# Patient Record
Sex: FEMALE | Race: AMERICAN INDIAN OR ALASKA NATIVE
[De-identification: names, ages, dates, MRNs, and addresses within clinical notes are randomized per-mention and may not be internally consistent; named-entity substitution may affect disease eponyms.]

---

## 2019-07-17 ENCOUNTER — HOSPITAL ENCOUNTER (INPATIENT)
Dept: HOSPITAL 5 - OR | Age: 69
LOS: 1 days | Discharge: HOME | DRG: 748 | End: 2019-07-18
Attending: OBSTETRICS & GYNECOLOGY | Admitting: OBSTETRICS & GYNECOLOGY
Payer: MEDICARE

## 2019-07-17 DIAGNOSIS — Z88.5: ICD-10-CM

## 2019-07-17 DIAGNOSIS — N81.6: ICD-10-CM

## 2019-07-17 DIAGNOSIS — N81.10: Primary | ICD-10-CM

## 2019-07-17 DIAGNOSIS — Z90.710: ICD-10-CM

## 2019-07-17 PROCEDURE — 85018 HEMOGLOBIN: CPT

## 2019-07-17 PROCEDURE — 85014 HEMATOCRIT: CPT

## 2019-07-17 PROCEDURE — 36415 COLL VENOUS BLD VENIPUNCTURE: CPT

## 2019-07-17 PROCEDURE — 88305 TISSUE EXAM BY PATHOLOGIST: CPT

## 2019-07-17 PROCEDURE — 0JQC0ZZ REPAIR PELVIC REGION SUBCUTANEOUS TISSUE AND FASCIA, OPEN APPROACH: ICD-10-PCS | Performed by: OBSTETRICS & GYNECOLOGY

## 2019-07-17 RX ADMIN — DEXTROSE, SODIUM CHLORIDE, SODIUM LACTATE, POTASSIUM CHLORIDE, AND CALCIUM CHLORIDE SCH MLS/HR: 5; .6; .31; .03; .02 INJECTION, SOLUTION INTRAVENOUS at 21:12

## 2019-07-17 RX ADMIN — KETOROLAC TROMETHAMINE SCH: 30 INJECTION, SOLUTION INTRAMUSCULAR at 18:11

## 2019-07-17 RX ADMIN — KETOROLAC TROMETHAMINE SCH: 30 INJECTION, SOLUTION INTRAMUSCULAR at 12:47

## 2019-07-17 RX ADMIN — KETOROLAC TROMETHAMINE SCH MG: 30 INJECTION, SOLUTION INTRAMUSCULAR at 21:31

## 2019-07-17 RX ADMIN — ONDANSETRON PRN MG: 2 INJECTION INTRAMUSCULAR; INTRAVENOUS at 20:30

## 2019-07-17 RX ADMIN — DEXTROSE, SODIUM CHLORIDE, SODIUM LACTATE, POTASSIUM CHLORIDE, AND CALCIUM CHLORIDE SCH MLS/HR: 5; .6; .31; .03; .02 INJECTION, SOLUTION INTRAVENOUS at 12:50

## 2019-07-17 RX ADMIN — ONDANSETRON PRN MG: 2 INJECTION INTRAMUSCULAR; INTRAVENOUS at 12:40

## 2019-07-17 NOTE — ANESTHESIA CONSULTATION
Anesthesia Consult and Med Hx


Date of service: 07/17/19





- Airway


Anesthetic Teeth Evaluation: Good


ROM Head & Neck: Adequate


Mental/Hyoid Distance: Adequate


Mallampati Class: Class I


Intubation Access Assessment: Good





- Pulmonary Exam


CTA: Yes





- Cardiac Exam


Cardiac Exam: RRR





- Pre-Operative Health Status


ASA Pre-Surgery Classification: ASA1


Proposed Anesthetic Plan: General





- Pulmonary


Hx Smoking: No


Hx Respiratory Symptoms: No


Hx Sleep Apnea: No





- Cardiovascular System


Hx Hypertension: No


Hx Heart Attack/AMI: No


Hx Percutaneous Transluminal Coronary Angioplasty (PTCA): No


Hx Cardia Arrhythmia: No





- Central Nervous System


Hx Seizures: No


CVA: No


Hx Psychiatric Problems: No





- Gastrointestinal


Hx Gastroesophageal Reflux Disease: No





- Endocrine


Hx Renal Disease: No


Hx Liver Disease: No


Hx Insulin Dependent Diabetes: No


Hx Non-Insulin Dependent Diabetes: No


Hx Thyroid Disease: No





- Other Systems


Hx Obesity: No





- Additional Comments


Anesthesia Medical History Comments: No hx anesthetic complications. Very active

lifestyle.

## 2019-07-17 NOTE — HISTORY AND PHYSICAL REPORT
History of Present Illness


Date of examination: 19


Date of admission: 


2019


Chief complaint: 





My bladder is dropping


History of present illness: 





Pt is a 68 year old female  in good health who presents for management of 

Grade 2 cystocele. Pt complains of vaginal pressure and feeling like a ball is 

coming out





Past History


Past Medical History: no pertinent history


Past Surgical History: hysterectomy


Family/Genetic History: none


Social history: 





Medications and Allergies


                                    Allergies











Allergy/AdvReac Type Severity Reaction Status Date / Time


 


codeine Allergy  severe N&V Verified 07/15/19 08:03


 


morphine Allergy  Nausea Verified 07/15/19 08:03











                                Home Medications











 Medication  Instructions  Recorded  Confirmed  Last Taken  Type


 


Multivitamins Chewables Tablet 1 tab PO DAILY 06/01/16 07/15/19 Unknown History











Active Meds: 


Active Medications





Celecoxib (Celebrex)  200 mg PO PREOP NR


   Stop: 19 13:00


   Last Admin: 19 07:55 Dose:  200 mg


   Documented by: 


Gabapentin (Neurontin)  300 mg PO PREOP NR


   Stop: 19 13:00


   Last Admin: 19 07:55 Dose:  300 mg


   Documented by: 


Hydromorphone HCl (Dilaudid)  0.5 mg IV Q10MIN PRN


   PRN Reason: Pain , Severe (7-10)


   Stop: 19 20:00


Lactated Ringer's (Lactated Ringers)  1,000 mls @ 100 mls/hr IV AS DIRECT ALINA


   Last Admin: 19 07:55 Dose:  100 mls/hr


   Documented by: 











Review of Systems


All systems: negative


Genitourinary: other (vaginal pressure, vaginal dryness)





- Vital Signs


Vital signs: 


                                   Vital Signs











Resp


 


 19 07:55








                                        











Temp Pulse Resp BP Pulse Ox


 


       19 07:55      














- Physical Exam


Breasts: Positive: deferred


Cardiovascular: Regular rate, Normal S1, Normal S2


Lungs: Positive: Clear to auscultation, Normal air movement


Abdomen: Positive: normal appearance, soft, normal bowel sounds.  Negative: 

distention, tenderness


Genitourinary (Female): Positive: normal external genitalia, normal perenium


Vulva: both: normal


Vagina: Positive: normal moisture.  Negative: discharge


Cervix: Negative: lesion, discharge


Uterus: Positive: absent


Adnexa: both: normal


Anus/Rectum: Positive: normal perianal skin, heme negative.  Negative: rectal 

mass, hemorrhoids


Extremities: 


Deep Tendon Reflex Grade: Normal +2





Results


All other labs normal.








Assessment and Plan





68 year old female here for management of cystocele. Admit for treatment. 

Consents signed and on chart

## 2019-07-17 NOTE — POST OPERATIVE NOTE
Pre-op diagnosis: Cystocele and Rectocele


Post-op diagnosis: same


Findings: 





Grade 2 cytocele and rectocele


Procedure: 





Anterior and Posterior repair


Anesthesia: GETA


Surgeon: MARIBEL BALLARD


Estimated blood loss: other (200cc)


Pathology: list (portions of vaginal tissue)


Specimen disposition: to lab


Condition: stable


Disposition: PACU

## 2019-07-18 VITALS — SYSTOLIC BLOOD PRESSURE: 108 MMHG | DIASTOLIC BLOOD PRESSURE: 55 MMHG

## 2019-07-18 LAB
HCT VFR BLD CALC: 26.9 % (ref 30.3–42.9)
HGB BLD-MCNC: 9 GM/DL (ref 10.1–14.3)

## 2019-07-18 RX ADMIN — DEXTROSE, SODIUM CHLORIDE, SODIUM LACTATE, POTASSIUM CHLORIDE, AND CALCIUM CHLORIDE SCH MLS/HR: 5; .6; .31; .03; .02 INJECTION, SOLUTION INTRAVENOUS at 04:29

## 2019-07-18 RX ADMIN — KETOROLAC TROMETHAMINE SCH MG: 30 INJECTION, SOLUTION INTRAMUSCULAR at 12:33

## 2019-07-18 NOTE — DISCHARGE SUMMARY
Providers





- Providers


Date of Admission: 


07/17/19 11:40





Date of discharge: 07/18/19


Attending physician: 


MARIBEL BALLARD





Primary care physician: 


RASHAD SHER MD








Hospitalization


Reason for admission: other (vaginal prolapse)


Procedure: other


Procedure details: 





see op report


Discharge diagnosis: other


Hospital course: 





unremarkable except for some nausea and vomiting last night after surgery


Condition at discharge: Good


Disposition: DC-01 TO HOME OR SELFCARE





Plan





- Discharge Medications


Prescriptions: 


Ibuprofen [Motrin] 800 mg PO Q8HR PRN #40 tablet


 PRN Reason: Pain, Moderate (4-6)





- Provider Discharge Summary


Activity: routine, no heavy lifting 4 weeks, no strenuous exercise


Diet: routine


Instructions: routine


Additional instructions: 


[]  Smoking cessation referral if applicable(refer to patient education folder 

for contact #)


[]  Refer to [facility] Booklet








Call your doctor immediately for:


* Fever > 100.5


* Heavy vaginal bleeding ( >1 pad per hour)


* Severe persistent headache


* Shortness of breath


* Reddened, hot, painful area to leg or breast


* Drainage or odor from incision.





* Keep incision clean and dry at all times and follow doctor's instructions 

regarding bathing/showering











- Follow up plan


Follow up: 


MARIBEL BALLARD MD [Staff Physician] - 14 Days

## 2019-08-07 NOTE — OPERATIVE REPORT
Operative Report


Operative Report: 





Preoperative diagnoses: 1 cystocele, 2 rectocele status post hysterectomy.


Postoperative diagnosis: Same


Procedure: Anterior and posterior repair


Surgeon: Dr. Kat Quiroga


Anesthesia: Gen.


EBL: 200 mL


Urine output: 200 mL clear and yellow at end of the procedure


IV fluids: 1200 mL LR


Complications: none





Specimen: Excess vaginal mucosa





Procedure: The patient taken to the OR with IV running and in place.  She had 

been properly identified as herself.  She is placed in dorsal lithotomy 

position.  Attention was turned to her perineum.  A Ba catheter was placed 

into her urethra.  Attention is first turned to the posterior fourchette, where 

a weighted speculum was placed into the vagina the mucosa overlying the urethra 

was grasped and onto incision was made along the mucosa overlying the bladder.  

The tissue was dissected off bilaterally to the pubovesical fascia the fascia 

was then reapproximated in the midline using individual sutures of 2-0 Vicryl 

once they were reapproximated to the level of the cul-de-sac and the remaining 

tissue was dissected off and then the final mucosa was oversewn.  Attention was 

then turned to the posterior vaginal wall.  Eliza-shaped incision was made at 

the posterior fourchette.  The rectal side of the vaginal mucosa was dissected 

from the underlying rectum out to the level of the fascia bilaterally.  The 

fascia was then reapproximated in the midline overlying the rectum.  The excess 

tissue was removed.  And finally the remaining mucosa was reapproximated in the 

midline.  There was excellent hemostasis at this portion of the procedure.  The 

vagina was packed with gauze that had been soaked with Premarin cream.  The 

patient was then awakened currently in stable condition.  Tolerated the 

procedure well.